# Patient Record
Sex: MALE | Race: WHITE | NOT HISPANIC OR LATINO | URBAN - METROPOLITAN AREA
[De-identification: names, ages, dates, MRNs, and addresses within clinical notes are randomized per-mention and may not be internally consistent; named-entity substitution may affect disease eponyms.]

---

## 2017-06-28 ENCOUNTER — EMERGENCY (EMERGENCY)
Facility: HOSPITAL | Age: 32
LOS: 1 days | Discharge: HOME | End: 2017-06-28
Attending: EMERGENCY MEDICINE | Admitting: EMERGENCY MEDICINE
Payer: COMMERCIAL

## 2017-06-28 VITALS
OXYGEN SATURATION: 96 % | SYSTOLIC BLOOD PRESSURE: 161 MMHG | TEMPERATURE: 98 F | HEART RATE: 103 BPM | RESPIRATION RATE: 18 BRPM | DIASTOLIC BLOOD PRESSURE: 99 MMHG

## 2017-06-28 DIAGNOSIS — R41.82 ALTERED MENTAL STATUS, UNSPECIFIED: ICD-10-CM

## 2017-06-28 DIAGNOSIS — F10.129 ALCOHOL ABUSE WITH INTOXICATION, UNSPECIFIED: ICD-10-CM

## 2017-06-28 PROCEDURE — 99283 EMERGENCY DEPT VISIT LOW MDM: CPT | Mod: 25

## 2017-06-28 NOTE — ED ADULT NURSE NOTE - OBJECTIVE STATEMENT
Pt found wondering in an apartment building. Pt admits to drinking alcohol tonight. Pt now A&O X 3, able to walk with steady gait.

## 2017-06-28 NOTE — ED ADULT NURSE NOTE - CHIEF COMPLAINT QUOTE
Adrian JIANG from [address] [place] [address], found wandering in an apt building.

## 2017-06-28 NOTE — ED PROVIDER NOTE - OBJECTIVE STATEMENT
patient arrives intoxicated, found by police attempting to walk into peoples' apartments. patient endorses heavy etoh, alcohol on breath. poor historian. arrives ambulatory with ems.
